# Patient Record
Sex: MALE | URBAN - METROPOLITAN AREA
[De-identification: names, ages, dates, MRNs, and addresses within clinical notes are randomized per-mention and may not be internally consistent; named-entity substitution may affect disease eponyms.]

---

## 2021-11-16 ENCOUNTER — APPOINTMENT (RX ONLY)
Dept: URBAN - METROPOLITAN AREA CLINIC 74 | Facility: CLINIC | Age: 36
Setting detail: DERMATOLOGY
End: 2021-11-16

## 2021-11-16 DIAGNOSIS — L64.8 OTHER ANDROGENIC ALOPECIA: ICD-10-CM | Status: INADEQUATELY CONTROLLED

## 2021-11-16 DIAGNOSIS — L63.8 OTHER ALOPECIA AREATA: ICD-10-CM

## 2021-11-16 PROCEDURE — ? INTRALESIONAL KENALOG

## 2021-11-16 PROCEDURE — 99203 OFFICE O/P NEW LOW 30 MIN: CPT | Mod: 25

## 2021-11-16 PROCEDURE — ? DIAGNOSIS COMMENT

## 2021-11-16 PROCEDURE — ? PATIENT SPECIFIC COUNSELING

## 2021-11-16 PROCEDURE — 11900 INJECT SKIN LESIONS </W 7: CPT

## 2021-11-16 PROCEDURE — ? COUNSELING

## 2021-11-16 PROCEDURE — ? ADDITIONAL NOTES

## 2021-11-16 ASSESSMENT — LOCATION DETAILED DESCRIPTION DERM
LOCATION DETAILED: POSTERIOR MID-PARIETAL SCALP
LOCATION DETAILED: LEFT CENTRAL BUCCAL CHEEK

## 2021-11-16 ASSESSMENT — LOCATION SIMPLE DESCRIPTION DERM
LOCATION SIMPLE: POSTERIOR SCALP
LOCATION SIMPLE: LEFT CHEEK

## 2021-11-16 ASSESSMENT — LOCATION ZONE DERM
LOCATION ZONE: FACE
LOCATION ZONE: SCALP

## 2021-11-16 NOTE — PROCEDURE: ADDITIONAL NOTES
Detail Level: Simple
Additional Notes: Alopecia counseling \\n-  reviewed this is a new chronic problem that is chronic and difficult to treat high high probability of resulting in worsening leading to baldness.  Although this is a benign condition it can result in anxiety, depression, low self esteem and social isolation \\n- discussed treatment options including rogaine 5%, finasteride, low level light therapy,  spironolactone, platelet rich plasma injections, hair transplant and non-FDA approved  treatments including oral minoxidil  \\nif pt decides to proceed with finasteride,  recommended serum PSA at baseline and new baseline at 6 months while on medication finasteride.  Pt may need periodic testing thereafter depending on age and risk factors for prostate cancer \\n- reviewed side effect profile and common and serious adverse reactions to include low blood pressure with special emphasis on sexual dysfunction, impotence, decreased libido and prostate and breast cancer risk associated with use of this medication \\n- discussed other treatment options for alopecia including Rogaine, low level light therapy caps, platelet rich plasma and hair transplant.   Reviewed other non FDA approved options including oral minoxidil,
Render Risk Assessment In Note?: no

## 2021-11-16 NOTE — PROCEDURE: DIAGNOSIS COMMENT
Comment: - patient currently using compound topical from online pharmacy [facility]\\n- does not know concentrations of minoxidil or finasteride\\n- patient had SE of depression on oral finasteride\\n- reviewed tx option, if no results, would rx compound of minoxidil 7%/ lataonprost 0.1%/ retinoid acid 0.025%\\n- patient to research
Render Risk Assessment In Note?: no
Detail Level: Simple

## 2021-11-16 NOTE — PROCEDURE: MIPS QUALITY
Detail Level: Detailed
Additional Notes: COVID vaccination received
Quality 110: Preventive Care And Screening: Influenza Immunization: Influenza immunization was not ordered or administered, reason not given
Quality 130: Documentation Of Current Medications In The Medical Record: Current Medications Documented

## 2021-11-16 NOTE — HPI: HAIR LOSS
How Did The Hair Loss Occur?: gradual in onset
How Severe Is Your Hair Loss?: moderate
Additional History: Pt reports hair loss on scalp and face. States hair loss on scalp the past 5 years and currently using topical rogaine and propecia to treat which kept condition stable. Failed oral propecia due to side effect of depression. States hair loss on face the oast 3-6 months, currently using no treatment.

## 2021-11-16 NOTE — PROCEDURE: INTRALESIONAL KENALOG
Validate Note Data When Using Inventory: Yes
Kenalog Preparation: Kenalog
Include Z78.9 (Other Specified Conditions Influencing Health Status) As An Associated Diagnosis?: No
Consent: The risks of atrophy were reviewed with the patient.
Concentration Of Solution Injected (Mg/Ml): 5.0
Total Volume Injected (Ccs- Only Use Numbers And Decimals): 0.5
X Size Of Lesion In Cm (Optional): 0
Administered By (Optional): Dr Anya Vázquez
Medical Necessity Clause: This procedure was medically necessary because the lesions that were treated were:
Detail Level: Simple

## 2021-11-16 NOTE — PROCEDURE: PATIENT SPECIFIC COUNSELING
Detail Level: Zone
- Pt reports hair loss on scalp and face\\n- States hair loss on scalp the past 5 years\\n- currently using topical rogaine and topical propecia to treat which kept condition stable\\n- Failed oral propecia due to side effect of depression\\n- recommended hair cap light therapy \\n- consider compounding cr if no improvement on other topical
- States hair loss on face the past 3-6 months\\n- currently using no treatment\\n- disc alopecia areata, hair will return but hair growth is a slow process \\n- disc ilk injections to treat with faster results \\n- pt opted ilk injections \\n- recommended viviscal and nutrafol, disc PRP

## 2021-12-14 ENCOUNTER — APPOINTMENT (RX ONLY)
Dept: URBAN - METROPOLITAN AREA CLINIC 74 | Facility: CLINIC | Age: 36
Setting detail: DERMATOLOGY
End: 2021-12-14

## 2021-12-14 DIAGNOSIS — L63.8 OTHER ALOPECIA AREATA: ICD-10-CM | Status: IMPROVED

## 2021-12-14 PROCEDURE — ? INTRALESIONAL KENALOG

## 2021-12-14 PROCEDURE — 11900 INJECT SKIN LESIONS </W 7: CPT

## 2021-12-14 PROCEDURE — ? COUNSELING

## 2021-12-14 ASSESSMENT — LOCATION SIMPLE DESCRIPTION DERM: LOCATION SIMPLE: LEFT CHEEK

## 2021-12-14 ASSESSMENT — LOCATION ZONE DERM: LOCATION ZONE: FACE

## 2021-12-14 ASSESSMENT — LOCATION DETAILED DESCRIPTION DERM: LOCATION DETAILED: LEFT CENTRAL BUCCAL CHEEK

## 2021-12-14 NOTE — PROCEDURE: MIPS QUALITY
Detail Level: Detailed
Additional Notes: COVID vaccination received
Quality 110: Preventive Care And Screening: Influenza Immunization: Influenza Immunization Administered during Influenza season
Quality 130: Documentation Of Current Medications In The Medical Record: Current Medications Documented

## 2021-12-14 NOTE — PROCEDURE: COUNSELING
Detail Level: Detailed
Patient Specific Counseling (Will Not Stick From Patient To Patient): - disc regrowth noted on exam; white regrowth while re-pigment \\n- disc repeat ILK inj (r/b/se); disc additional inj may be necessary (approx 3-6 rounds) \\n- RTC 1 mo

## 2021-12-14 NOTE — PROCEDURE: INTRALESIONAL KENALOG
Validate Note Data When Using Inventory: Yes
Treatment Number (Optional): 2
Kenalog Preparation: Kenalog
Include Z78.9 (Other Specified Conditions Influencing Health Status) As An Associated Diagnosis?: No
Consent: The risks of atrophy were reviewed with the patient.
Concentration Of Solution Injected (Mg/Ml): 5.0
Total Volume Injected (Ccs- Only Use Numbers And Decimals): 0.3
X Size Of Lesion In Cm (Optional): 0
Administered By (Optional): Dr Anya Vázquez
Medical Necessity Clause: This procedure was medically necessary because the lesions that were treated were:
Detail Level: Simple

## 2022-03-28 ENCOUNTER — APPOINTMENT (RX ONLY)
Dept: URBAN - METROPOLITAN AREA CLINIC 74 | Facility: CLINIC | Age: 37
Setting detail: DERMATOLOGY
End: 2022-03-28

## 2022-03-28 DIAGNOSIS — L63.8 OTHER ALOPECIA AREATA: ICD-10-CM | Status: IMPROVED

## 2022-03-28 PROCEDURE — ? INTRALESIONAL KENALOG

## 2022-03-28 PROCEDURE — 11900 INJECT SKIN LESIONS </W 7: CPT

## 2022-03-28 PROCEDURE — ? COUNSELING

## 2022-03-28 PROCEDURE — ? PATIENT SPECIFIC COUNSELING

## 2022-03-28 ASSESSMENT — LOCATION SIMPLE DESCRIPTION DERM
LOCATION SIMPLE: LEFT LIP
LOCATION SIMPLE: RIGHT CHEEK

## 2022-03-28 ASSESSMENT — LOCATION ZONE DERM
LOCATION ZONE: LIP
LOCATION ZONE: FACE

## 2022-03-28 ASSESSMENT — LOCATION DETAILED DESCRIPTION DERM
LOCATION DETAILED: LEFT LOWER CUTANEOUS LIP
LOCATION DETAILED: RIGHT CENTRAL BUCCAL CHEEK

## 2022-03-28 NOTE — PROCEDURE: PATIENT SPECIFIC COUNSELING
Detail Level: Zone
- pt reports initial improvement after 2 rounds but worsening recently \\n- disc regrowth noted on exam; white regrowth while re-pigment \\n- disc repeat ILK inj (r/b/se)\\n- disc additional inj may be necessary (approx 3-6 rounds) \\n- RTC 1 mo

## 2022-03-28 NOTE — PROCEDURE: MIPS QUALITY
Quality 110: Preventive Care And Screening: Influenza Immunization: Influenza Immunization Administered during Influenza season
Quality 130: Documentation Of Current Medications In The Medical Record: Current Medications Documented
Detail Level: Detailed
Additional Notes: Pt received covid vaccines + booster

## 2022-03-28 NOTE — PROCEDURE: INTRALESIONAL KENALOG
X Size Of Lesion In Cm (Optional): 0
Total Volume Injected (Ccs- Only Use Numbers And Decimals): 0.8
Concentration Of Solution Injected (Mg/Ml): 5.0
Kenalog Preparation: Kenalog
Medical Necessity Clause: This procedure was medically necessary because the lesions that were treated were:
Validate Note Data When Using Inventory: Yes
Include Z78.9 (Other Specified Conditions Influencing Health Status) As An Associated Diagnosis?: No
Consent: The risks of atrophy were reviewed with the patient.
Treatment Number (Optional): 3
Detail Level: Simple
Administered By (Optional): Dr Anya Vázquez

## 2022-04-26 ENCOUNTER — APPOINTMENT (RX ONLY)
Dept: URBAN - METROPOLITAN AREA CLINIC 74 | Facility: CLINIC | Age: 37
Setting detail: DERMATOLOGY
End: 2022-04-26

## 2022-04-26 DIAGNOSIS — L63.8 OTHER ALOPECIA AREATA: ICD-10-CM

## 2022-04-26 PROCEDURE — ? INTRALESIONAL KENALOG

## 2022-04-26 PROCEDURE — ? TREATMENT REGIMEN

## 2022-04-26 PROCEDURE — ? COUNSELING

## 2022-04-26 PROCEDURE — ? PATIENT SPECIFIC COUNSELING

## 2022-04-26 PROCEDURE — 11900 INJECT SKIN LESIONS </W 7: CPT

## 2022-04-26 ASSESSMENT — LOCATION DETAILED DESCRIPTION DERM
LOCATION DETAILED: LEFT LOWER CUTANEOUS LIP
LOCATION DETAILED: RIGHT CENTRAL BUCCAL CHEEK

## 2022-04-26 ASSESSMENT — LOCATION SIMPLE DESCRIPTION DERM
LOCATION SIMPLE: LEFT LIP
LOCATION SIMPLE: RIGHT CHEEK

## 2022-04-26 ASSESSMENT — LOCATION ZONE DERM
LOCATION ZONE: FACE
LOCATION ZONE: LIP

## 2022-04-26 NOTE — PROCEDURE: INTRALESIONAL KENALOG
X Size Of Lesion In Cm (Optional): 0
Total Volume Injected (Ccs- Only Use Numbers And Decimals): 0.4
Concentration Of Solution Injected (Mg/Ml): 5.0
Kenalog Preparation: Kenalog
Medical Necessity Clause: This procedure was medically necessary because the lesions that were treated were:
Validate Note Data When Using Inventory: Yes
Include Z78.9 (Other Specified Conditions Influencing Health Status) As An Associated Diagnosis?: No
Consent: The risks of atrophy were reviewed with the patient.
Treatment Number (Optional): 4
Detail Level: Simple
Administered By (Optional): Dr Anya Vázquez

## 2022-04-26 NOTE — PROCEDURE: PATIENT SPECIFIC COUNSELING
Detail Level: Zone
- pt reports slight improvement\\n- white regrowth noted on exam\\n- disc repeat ILK inj (r/b/se)\\n- disc additional inj may be necessary (approx 3-6 rounds) \\n- pt has a compound finasteride/minoxidil; can use on beard area \\n- pt endorsed se’s of depression with oral finasteride \\n- RTC 1 mo

## 2022-05-24 ENCOUNTER — APPOINTMENT (RX ONLY)
Dept: URBAN - METROPOLITAN AREA CLINIC 74 | Facility: CLINIC | Age: 37
Setting detail: DERMATOLOGY
End: 2022-05-24